# Patient Record
Sex: FEMALE | Race: WHITE | Employment: UNEMPLOYED | URBAN - METROPOLITAN AREA
[De-identification: names, ages, dates, MRNs, and addresses within clinical notes are randomized per-mention and may not be internally consistent; named-entity substitution may affect disease eponyms.]

---

## 2021-07-30 ENCOUNTER — TRANSCRIBE ORDER (OUTPATIENT)
Dept: SCHEDULING | Age: 36
End: 2021-07-30

## 2021-07-30 DIAGNOSIS — N64.4 BREAST PAIN: ICD-10-CM

## 2021-07-30 DIAGNOSIS — N60.19 DIFFUSE CYSTIC MASTOPATHY: Primary | ICD-10-CM

## 2021-08-09 ENCOUNTER — HOSPITAL ENCOUNTER (OUTPATIENT)
Dept: MAMMOGRAPHY | Age: 36
Discharge: HOME | End: 2021-08-09
Attending: OBSTETRICS & GYNECOLOGY
Payer: OTHER GOVERNMENT

## 2021-08-09 ENCOUNTER — TRANSCRIBE ORDER (OUTPATIENT)
Dept: MAMMOGRAPHY | Age: 36
End: 2021-08-09

## 2021-08-09 DIAGNOSIS — N63.20 LUMP OF LEFT BREAST: ICD-10-CM

## 2021-08-09 DIAGNOSIS — N60.19 DIFFUSE CYSTIC MASTOPATHY: ICD-10-CM

## 2021-08-09 DIAGNOSIS — N63.20 LUMP OF LEFT BREAST: Primary | ICD-10-CM

## 2021-08-09 DIAGNOSIS — N64.4 BREAST PAIN: ICD-10-CM

## 2021-08-09 PROCEDURE — 76642 ULTRASOUND BREAST LIMITED: CPT

## 2021-08-09 PROCEDURE — 77062 BREAST TOMOSYNTHESIS BI: CPT

## 2023-06-16 ENCOUNTER — APPOINTMENT (OUTPATIENT)
Facility: HOSPITAL | Age: 38
End: 2023-06-16
Payer: OTHER GOVERNMENT

## 2023-06-16 ENCOUNTER — HOSPITAL ENCOUNTER (EMERGENCY)
Facility: HOSPITAL | Age: 38
Discharge: HOME | End: 2023-06-16
Attending: EMERGENCY MEDICINE
Payer: OTHER GOVERNMENT

## 2023-06-16 VITALS
BODY MASS INDEX: 25.44 KG/M2 | TEMPERATURE: 98.4 F | RESPIRATION RATE: 19 BRPM | DIASTOLIC BLOOD PRESSURE: 77 MMHG | WEIGHT: 149 LBS | HEIGHT: 64 IN | HEART RATE: 69 BPM | OXYGEN SATURATION: 98 % | SYSTOLIC BLOOD PRESSURE: 128 MMHG

## 2023-06-16 DIAGNOSIS — R10.84 GENERALIZED ABDOMINAL PAIN: Primary | ICD-10-CM

## 2023-06-16 DIAGNOSIS — K59.00 CONSTIPATION, UNSPECIFIED CONSTIPATION TYPE: ICD-10-CM

## 2023-06-16 LAB
APPEARANCE UR: CLEAR
BACTERIA URNS QL MICRO: ABNORMAL /HPF
BILIRUB UR QL: NEGATIVE
COLOR UR: YELLOW
EPITH CASTS URNS QL MICRO: ABNORMAL /LPF
GLUCOSE UR STRIP.AUTO-MCNC: NEGATIVE MG/DL
HCG UR QL: NEGATIVE
HGB UR QL STRIP: NEGATIVE
KETONES UR QL STRIP.AUTO: NEGATIVE MG/DL
LEUKOCYTE ESTERASE UR QL STRIP.AUTO: NEGATIVE
NITRITE UR QL STRIP.AUTO: NEGATIVE
PH UR STRIP: 6 (ref 5–8)
PROT UR STRIP-MCNC: NEGATIVE MG/DL
RBC #/AREA URNS HPF: ABNORMAL /HPF (ref 0–5)
SP GR UR REFRACTOMETRY: 1.01 (ref 1–1.03)
URINE CULTURE IF INDICATED: ABNORMAL
UROBILINOGEN UR QL STRIP.AUTO: 0.1 EU/DL (ref 0.2–1)
WBC URNS QL MICRO: ABNORMAL /HPF (ref 0–4)

## 2023-06-16 PROCEDURE — 81025 URINE PREGNANCY TEST: CPT

## 2023-06-16 PROCEDURE — 81001 URINALYSIS AUTO W/SCOPE: CPT

## 2023-06-16 PROCEDURE — 99284 EMERGENCY DEPT VISIT MOD MDM: CPT

## 2023-06-16 PROCEDURE — 74018 RADEX ABDOMEN 1 VIEW: CPT

## 2023-06-16 RX ORDER — MAGNESIUM CITRATE
150 SOLUTION, ORAL ORAL ONCE
Qty: 1 ML | Refills: 0 | Status: SHIPPED | OUTPATIENT
Start: 2023-06-16 | End: 2023-06-16

## 2023-06-16 RX ORDER — POLYETHYLENE GLYCOL 3350 17 G/17G
17 POWDER ORAL DAILY
Qty: 116 G | Refills: 0 | Status: SHIPPED | OUTPATIENT
Start: 2023-06-16 | End: 2023-06-21

## 2023-06-16 RX ORDER — DOCUSATE SODIUM 100 MG/1
100 CAPSULE, LIQUID FILLED ORAL 2 TIMES DAILY
Qty: 10 CAPSULE | Refills: 0 | Status: SHIPPED | OUTPATIENT
Start: 2023-06-16 | End: 2023-06-21

## 2023-06-16 ASSESSMENT — PAIN DESCRIPTION - DESCRIPTORS: DESCRIPTORS: TENDER

## 2023-06-16 ASSESSMENT — PAIN SCALES - GENERAL: PAINLEVEL_OUTOF10: 2

## 2023-06-16 ASSESSMENT — PAIN DESCRIPTION - LOCATION: LOCATION: ABDOMEN

## 2023-06-16 ASSESSMENT — PAIN - FUNCTIONAL ASSESSMENT: PAIN_FUNCTIONAL_ASSESSMENT: 0-10

## 2023-06-17 NOTE — ED TRIAGE NOTES
Pt reports chronic constipation x 2 months. Pt reports multiple OTC stool softeners and no relief.  Pt reports onset of abdominal tenderness and swelling x 3 days

## 2023-06-17 NOTE — ED PROVIDER NOTES
clinical concern for pneumoperitoneum, bowel obstruction, pancreatitis, biliary colic, or appendicitis. UA is negative for pregnancy and no signs of UTI. KUB was consistent with constipation. Patient was discharged with bowel prep regimen that includes Dulcolax, MiraLAX, and magnesium citrate in stable condition. Return cautions given. Vital Signs-Reviewed the patient's vital signs. [unfilled]    Records Reviewed:  Available old ER/admission charts reviewed    ED Course:   Initial assessment performed. The patients presenting problems have been discussed, and they are in agreement with the care plan formulated and outlined with them. I have encouraged them to ask questions as they arise throughout their visit. PROCEDURES      Disposition: Condition stable   DC- Adult Discharges: All of the diagnostic tests were reviewed and questions answered. Diagnosis, care plan and treatment options were discussed. understand instructions and will follow up as directed. The patients results have been reviewed with them. They have been counseled regarding their diagnosis. The patient verbally convey understanding and agreement of the signs, symptoms, diagnosis, treatment and prognosis and additionally agrees to follow up as recommended. They also agree with the care-plan and convey that all of their questions have been answered. I have also put together some discharge instructions for them that include: 1) educational information regarding their diagnosis, 2) how to care for their diagnosis at home, as well a 3) list of reasons why they would want to return to the ED prior to their follow-up appointment, should their condition change. PLAN:  1.       Medication List        START taking these medications      docusate sodium 100 MG capsule  Commonly known as: Dulcolax Pink Stool Softener  Take 1 capsule by mouth 2 times daily for 5 days     magnesium citrate Soln  Commonly known as: CITROMA  Take 150 mLs by